# Patient Record
(demographics unavailable — no encounter records)

---

## 2019-12-17 NOTE — DIAGNOSTIC IMAGING REPORT
EXAM:  US THYROID



DATE: 12/17/2019 12:41 PM  



INDICATION: Thyroid cyst  



COMPARISON: 4/28/2015



FINDINGS:

The right thyroid lobe is normal in size measuring 4.5 x 1.3 x 1.5 cm. The

isthmus measures 3 mm and is unremarkable. The left thyroid lobe is normal in

size measuring 4.1 x 1.1 x 1.6 cm.



No focal thyroid nodule/cyst is identified. The previously visualized right

cystic thyroid nodule is no longer visualized. Thyroid vascularity is within

normal limits.





IMPRESSION:



Unremarkable thyroid ultrasound examination.



Signed by: Dr. Scott Álvarez MD on 12/17/2019 2:49 PM

## 2019-12-17 NOTE — DIAGNOSTIC IMAGING REPORT
EXAM:  KNEE LEFT THREE VIEWS



DATE: 12/17/2019 12:41 PM  



INDICATION: Left knee pain  



COMPARISON: None



IMPRESSION::

There is no evidence for acute fracture or dislocation. Bony mineralization is

within normal limits. No focal lytic or blastic abnormality is identified. 



There is mild joint space narrowing present within the medial femorotibial

compartment. 

There is mild osteophyte production along the superior aspect of the patella

which can be seen in setting of patellar tendinosis.



The surrounding soft tissues are unremarkable without evidence for radiopaque

foreign body or significant joint effusion.



Signed by: Dr. Scott Álvarez MD on 12/17/2019 2:56 PM